# Patient Record
Sex: FEMALE | Race: WHITE | NOT HISPANIC OR LATINO | Employment: STUDENT | URBAN - METROPOLITAN AREA
[De-identification: names, ages, dates, MRNs, and addresses within clinical notes are randomized per-mention and may not be internally consistent; named-entity substitution may affect disease eponyms.]

---

## 2021-10-12 ENCOUNTER — OFFICE VISIT (OUTPATIENT)
Dept: URGENT CARE | Facility: CLINIC | Age: 31
End: 2021-10-12
Payer: COMMERCIAL

## 2021-10-12 VITALS
HEIGHT: 67 IN | WEIGHT: 120 LBS | OXYGEN SATURATION: 99 % | HEART RATE: 105 BPM | RESPIRATION RATE: 18 BRPM | BODY MASS INDEX: 18.83 KG/M2 | DIASTOLIC BLOOD PRESSURE: 83 MMHG | TEMPERATURE: 98 F | SYSTOLIC BLOOD PRESSURE: 121 MMHG

## 2021-10-12 DIAGNOSIS — G44.209 ACUTE NON INTRACTABLE TENSION-TYPE HEADACHE: Primary | ICD-10-CM

## 2021-10-12 LAB
CTP QC/QA: YES
SARS-COV-2 RDRP RESP QL NAA+PROBE: NEGATIVE

## 2021-10-12 PROCEDURE — 96372 THER/PROPH/DIAG INJ SC/IM: CPT | Mod: S$GLB,,, | Performed by: NURSE PRACTITIONER

## 2021-10-12 PROCEDURE — U0002 COVID-19 LAB TEST NON-CDC: HCPCS | Mod: QW,S$GLB,, | Performed by: NURSE PRACTITIONER

## 2021-10-12 PROCEDURE — 96372 PR INJECTION,THERAP/PROPH/DIAG2ST, IM OR SUBCUT: ICD-10-PCS | Mod: S$GLB,,, | Performed by: NURSE PRACTITIONER

## 2021-10-12 PROCEDURE — U0002: ICD-10-PCS | Mod: QW,S$GLB,, | Performed by: NURSE PRACTITIONER

## 2021-10-12 PROCEDURE — 99203 OFFICE O/P NEW LOW 30 MIN: CPT | Mod: 25,S$GLB,, | Performed by: NURSE PRACTITIONER

## 2021-10-12 PROCEDURE — 99203 PR OFFICE/OUTPT VISIT, NEW, LEVL III, 30-44 MIN: ICD-10-PCS | Mod: 25,S$GLB,, | Performed by: NURSE PRACTITIONER

## 2021-10-12 RX ORDER — METHOCARBAMOL 500 MG/1
500 TABLET, FILM COATED ORAL 3 TIMES DAILY
Qty: 30 TABLET | Refills: 0 | Status: SHIPPED | OUTPATIENT
Start: 2021-10-12 | End: 2021-10-22

## 2021-10-12 RX ORDER — KETOROLAC TROMETHAMINE 30 MG/ML
30 INJECTION, SOLUTION INTRAMUSCULAR; INTRAVENOUS
Status: COMPLETED | OUTPATIENT
Start: 2021-10-12 | End: 2021-10-12

## 2021-10-12 RX ADMIN — KETOROLAC TROMETHAMINE 30 MG: 30 INJECTION, SOLUTION INTRAMUSCULAR; INTRAVENOUS at 11:10

## 2024-04-30 ENCOUNTER — OFFICE VISIT (OUTPATIENT)
Dept: PRIMARY CARE CLINIC | Facility: CLINIC | Age: 34
End: 2024-04-30
Payer: COMMERCIAL

## 2024-04-30 VITALS
HEART RATE: 106 BPM | OXYGEN SATURATION: 99 % | HEIGHT: 67 IN | DIASTOLIC BLOOD PRESSURE: 70 MMHG | RESPIRATION RATE: 18 BRPM | WEIGHT: 181.13 LBS | BODY MASS INDEX: 28.43 KG/M2 | SYSTOLIC BLOOD PRESSURE: 100 MMHG

## 2024-04-30 DIAGNOSIS — F33.0 MILD EPISODE OF RECURRENT MAJOR DEPRESSIVE DISORDER: ICD-10-CM

## 2024-04-30 DIAGNOSIS — Z00.00 ANNUAL PHYSICAL EXAM: Primary | ICD-10-CM

## 2024-04-30 PROCEDURE — 3078F DIAST BP <80 MM HG: CPT | Mod: CPTII,S$GLB,, | Performed by: NURSE PRACTITIONER

## 2024-04-30 PROCEDURE — 99999 PR PBB SHADOW E&M-EST. PATIENT-LVL III: CPT | Mod: PBBFAC,,, | Performed by: NURSE PRACTITIONER

## 2024-04-30 PROCEDURE — 1159F MED LIST DOCD IN RCRD: CPT | Mod: CPTII,S$GLB,, | Performed by: NURSE PRACTITIONER

## 2024-04-30 PROCEDURE — 3008F BODY MASS INDEX DOCD: CPT | Mod: CPTII,S$GLB,, | Performed by: NURSE PRACTITIONER

## 2024-04-30 PROCEDURE — 99203 OFFICE O/P NEW LOW 30 MIN: CPT | Mod: S$GLB,,, | Performed by: NURSE PRACTITIONER

## 2024-04-30 PROCEDURE — 3074F SYST BP LT 130 MM HG: CPT | Mod: CPTII,S$GLB,, | Performed by: NURSE PRACTITIONER

## 2024-04-30 RX ORDER — ESCITALOPRAM OXALATE 20 MG/1
TABLET ORAL
COMMUNITY
Start: 2022-05-29 | End: 2024-04-30 | Stop reason: SDUPTHER

## 2024-04-30 RX ORDER — ESCITALOPRAM OXALATE 20 MG/1
20 TABLET ORAL DAILY
Qty: 90 TABLET | Refills: 3 | Status: SHIPPED | OUTPATIENT
Start: 2024-04-30

## 2024-04-30 RX ORDER — ONDANSETRON 4 MG/1
4 TABLET, FILM COATED ORAL DAILY PRN
COMMUNITY
Start: 2024-04-05

## 2024-04-30 NOTE — PROGRESS NOTES
Chief Complaint  Chief Complaint   Patient presents with    Establish Care    Medication Refill       HPI    Patient here to establish care. Not previously under the care of PCP. Was previously being treated online virtually for mental health and other health care needs. Patient is a non-smoker. No excessive EtOH. Not seeing OBGYN currently. Last pap was 10 years ago. Normal. No abnormal pap in the past. No family h/o breast cancer. Family h/o premature CAD. Father with DM. No blood work on file. Recently completed school addiction medicine, finished her masters in clinical mental health counseling.     Recently started semiglutide 0.25 has been on for two months. Overall she has been feeling well on it, has lost 10 lbs.     NEGRO/MDD - Has been on lexapro 20 mg for some time. Overall she has been feeling well, sleeping well. No irritability. Anxiety has improved. Was previously on zoloft which made her exhausted but lexapro has helped tremendously.     Medication Refill  Pertinent negatives include no arthralgias, chest pain, chills, coughing, fever, headaches, joint swelling, nausea, neck pain, vomiting or weakness.           PAST MEDICAL HISTORY:  No past medical history on file.    PAST SURGICAL HISTORY:  No past surgical history on file.    SOCIAL HISTORY:  Social History     Socioeconomic History    Marital status: Single   Tobacco Use    Smokeless tobacco: Never   Substance and Sexual Activity    Alcohol use: Yes     Alcohol/week: 3.0 standard drinks of alcohol     Types: 3 Glasses of wine per week    Drug use: No    Sexual activity: Yes     Partners: Male     Birth control/protection: OCP   Social History Narrative    ** Merged History Encounter **            FAMILY HISTORY:  No family history on file.    ALLERGIES AND MEDICATIONS: updated and reviewed.  Review of patient's allergies indicates:  No Known Allergies  Current Outpatient Medications   Medication Sig Dispense Refill    levonorgestreL (MIRENA) 20  "mcg/24 hours (7 yrs) 52 mg IUD 1 each by Intrauterine route once.      ondansetron (ZOFRAN) 4 MG tablet Take 4 mg by mouth daily as needed.      semaglutide (OZEMPIC) 0.25 mg or 0.5 mg (2 mg/3 mL) pen injector Inject 0.5 mg into the skin every 7 days.      EScitalopram oxalate (LEXAPRO) 20 MG tablet Take 1 tablet (20 mg total) by mouth once daily. 90 tablet 3     No current facility-administered medications for this visit.         ROS  Review of Systems   Constitutional:  Negative for activity change, chills, fever and unexpected weight change.   HENT:  Negative for ear pain, hearing loss, postnasal drip, rhinorrhea, sinus pain and trouble swallowing.    Eyes:  Negative for discharge and visual disturbance.   Respiratory:  Negative for cough, chest tightness, shortness of breath and wheezing.    Cardiovascular:  Negative for chest pain and palpitations.   Gastrointestinal:  Negative for blood in stool, constipation, diarrhea, nausea and vomiting.   Endocrine: Negative for polydipsia and polyuria.   Genitourinary:  Negative for difficulty urinating, dysuria, hematuria and menstrual problem.   Musculoskeletal:  Negative for arthralgias, joint swelling and neck pain.   Neurological:  Negative for weakness and headaches.   Psychiatric/Behavioral:  Negative for confusion and dysphoric mood.            PHYSICAL EXAM  Vitals:    04/30/24 1458   BP: 100/70   BP Location: Right arm   Patient Position: Sitting   BP Method: Medium (Manual)   Pulse: 106   Resp: 18   SpO2: 99%   Weight: 82.2 kg (181 lb 1.7 oz)   Height: 5' 7" (1.702 m)    Body mass index is 28.37 kg/m².  Weight: 82.2 kg (181 lb 1.7 oz)   Height: 5' 7" (170.2 cm)     Physical Exam  Constitutional:       Appearance: She is well-developed.   HENT:      Head: Normocephalic.      Right Ear: Tympanic membrane normal.      Left Ear: Tympanic membrane normal.      Mouth/Throat:      Pharynx: Uvula midline.   Eyes:      Conjunctiva/sclera: Conjunctivae normal. "   Cardiovascular:      Rate and Rhythm: Normal rate and regular rhythm.      Pulses: Normal pulses.           Radial pulses are 2+ on the right side and 2+ on the left side.      Heart sounds: Normal heart sounds. No murmur heard.     Comments: No LE swelling noted  Pulmonary:      Effort: Pulmonary effort is normal.      Breath sounds: Normal breath sounds. No wheezing.   Abdominal:      General: Bowel sounds are normal.      Palpations: Abdomen is soft.      Tenderness: There is no abdominal tenderness.   Lymphadenopathy:      Cervical: No cervical adenopathy.   Skin:     General: Skin is warm and dry.      Findings: No rash.   Neurological:      Mental Status: She is alert and oriented to person, place, and time.           Health Maintenance         Date Due Completion Date    Hepatitis C Screening Never done ---    Lipid Panel Never done ---    HIV Screening Never done ---    Cervical Cancer Screening 10/23/2016 10/23/2013    COVID-19 Vaccine (5 - 2023-24 season) 09/01/2023 10/7/2022    TETANUS VACCINE 04/30/2025 (Originally 8/15/2017) 8/15/2007              Assessment & Plan    Problem List Items Addressed This Visit    None  Visit Diagnoses       Annual physical exam    -  Primary    Relevant Orders    CBC Auto Differential    Comprehensive Metabolic Panel    Lipid Panel    TSH    Hepatitis C Antibody    HIV 1/2 Ag/Ab (4th Gen)    Mild episode of recurrent major depressive disorder        Relevant Medications    ondansetron (ZOFRAN) 4 MG tablet    BMI 28.0-28.9,adult        Relevant Medications    semaglutide (OZEMPIC) 0.25 mg or 0.5 mg (2 mg/3 mL) pen injector            Follow-up: Follow up in about 1 year (around 4/30/2025) for lab work then routine physical.    Shea Bertrand    Medication List with Changes/Refills   Current Medications    LEVONORGESTREL (MIRENA) 20 MCG/24 HOURS (7 YRS) 52 MG IUD    1 each by Intrauterine route once.    ONDANSETRON (ZOFRAN) 4 MG TABLET    Take 4 mg by mouth daily as  needed.    SEMAGLUTIDE (OZEMPIC) 0.25 MG OR 0.5 MG (2 MG/3 ML) PEN INJECTOR    Inject 0.5 mg into the skin every 7 days.   Changed and/or Refilled Medications    Modified Medication Previous Medication    ESCITALOPRAM OXALATE (LEXAPRO) 20 MG TABLET EScitalopram oxalate (LEXAPRO) 20 MG tablet       Take 1 tablet (20 mg total) by mouth once daily.       Discontinued Medications    MICROGESTIN 1/20, 21, 1-20 MG-MCG PER TABLET    1 DAILY FOR A FULL CYCLE. STOP FOR 1 WEEK, THEN REPEAT, AS DIRECTED

## 2024-09-18 ENCOUNTER — OFFICE VISIT (OUTPATIENT)
Dept: OBSTETRICS AND GYNECOLOGY | Facility: CLINIC | Age: 34
End: 2024-09-18
Payer: COMMERCIAL

## 2024-09-18 VITALS
HEIGHT: 67 IN | WEIGHT: 175.06 LBS | BODY MASS INDEX: 27.48 KG/M2 | DIASTOLIC BLOOD PRESSURE: 64 MMHG | SYSTOLIC BLOOD PRESSURE: 101 MMHG

## 2024-09-18 DIAGNOSIS — Z12.4 SCREENING FOR CERVICAL CANCER: ICD-10-CM

## 2024-09-18 DIAGNOSIS — Z01.419 WELL WOMAN EXAM WITH ROUTINE GYNECOLOGICAL EXAM: Primary | ICD-10-CM

## 2024-09-18 PROCEDURE — 3008F BODY MASS INDEX DOCD: CPT | Mod: CPTII,S$GLB,, | Performed by: OBSTETRICS & GYNECOLOGY

## 2024-09-18 PROCEDURE — 99385 PREV VISIT NEW AGE 18-39: CPT | Mod: S$GLB,,, | Performed by: OBSTETRICS & GYNECOLOGY

## 2024-09-18 PROCEDURE — 3074F SYST BP LT 130 MM HG: CPT | Mod: CPTII,S$GLB,, | Performed by: OBSTETRICS & GYNECOLOGY

## 2024-09-18 PROCEDURE — 1160F RVW MEDS BY RX/DR IN RCRD: CPT | Mod: CPTII,S$GLB,, | Performed by: OBSTETRICS & GYNECOLOGY

## 2024-09-18 PROCEDURE — 99999 PR PBB SHADOW E&M-EST. PATIENT-LVL III: CPT | Mod: PBBFAC,,, | Performed by: OBSTETRICS & GYNECOLOGY

## 2024-09-18 PROCEDURE — 1159F MED LIST DOCD IN RCRD: CPT | Mod: CPTII,S$GLB,, | Performed by: OBSTETRICS & GYNECOLOGY

## 2024-09-18 PROCEDURE — 3078F DIAST BP <80 MM HG: CPT | Mod: CPTII,S$GLB,, | Performed by: OBSTETRICS & GYNECOLOGY

## 2024-09-18 NOTE — PROGRESS NOTES
"Subjective     Patient ID: Darian Hernandez is a 34 y.o. female.    Chief Complaint:  Well Woman      History of Present Illness  HPI  33yo  presents for annual exam.  Overall doing well, no complaints.  Mirena IUD inserted ~ 2018 (states due for removal in 2025, which is 7 year jessica).  Amenorrheic on IUD.  Sexually active without complaints.  No h/o abn paps or STDs.  Last pap ~5 years ago, nml.      GYN & OB History  No LMP recorded. Patient has had an implant.   Date of Last Pap: 2013    OB History    Para Term  AB Living   0 0 0 0 0 0   SAB IAB Ectopic Multiple Live Births   0 0 0 0 0       Review of Systems  Review of Systems   Constitutional:  Negative for chills and fever.   Respiratory:  Negative for shortness of breath.    Cardiovascular:  Negative for chest pain.   Gastrointestinal:  Negative for abdominal pain, constipation and diarrhea.   Genitourinary:  Negative for dyspareunia, pelvic pain and vaginal discharge.   Musculoskeletal:  Negative for myalgias.   Neurological:  Negative for headaches (occ stress HA).          Objective   Physical Exam    /64   Ht 5' 7" (1.702 m)   Wt 79.4 kg (175 lb 0.7 oz)   BMI 27.42 kg/m²     Gen: NAD  Resp: Normal respiratory effort  Breast: Symmetric, nontender.  No masses.  No skin changes.  No nipple discharge.   Abd: soft, NT  Pelvic: Normal-appearing external female genitalia.  IUD strings visible at os.  No CMT.  Uterus small, mobile, nontender.  No adnexal masses or tenderness.    Ext: normal ROM  Psych: appropriate affect  Neuro: grossly intact         Assessment and Plan     Darian was seen today for well woman.    Diagnoses and all orders for this visit:    Well woman exam with routine gynecological exam    Screening for cervical cancer  -     Liquid-Based Pap Smear, Screening  -     HPV High Risk Genotypes, PCR          Plan:  Routine annual exam with pap smear and breast exam today.  Declined STD " screening.  Doing well on Mirena IUD - discussed now approved for contraception up to 8 years.  If bleeding resumes sooner than that, can replace sooner.  Counseling done, precautions given, all questions answered.  RTC 1 year for annual, or prn.

## 2024-10-01 ENCOUNTER — PROCEDURE VISIT (OUTPATIENT)
Dept: OBSTETRICS AND GYNECOLOGY | Facility: CLINIC | Age: 34
End: 2024-10-01
Payer: COMMERCIAL

## 2024-10-01 VITALS
BODY MASS INDEX: 27.68 KG/M2 | HEIGHT: 67 IN | WEIGHT: 176.38 LBS | DIASTOLIC BLOOD PRESSURE: 63 MMHG | HEART RATE: 73 BPM | SYSTOLIC BLOOD PRESSURE: 99 MMHG

## 2024-10-01 DIAGNOSIS — R87.613 HSIL (HIGH GRADE SQUAMOUS INTRAEPITHELIAL LESION) ON PAP SMEAR OF CERVIX: Primary | ICD-10-CM

## 2024-10-01 PROCEDURE — 88342 IMHCHEM/IMCYTCHM 1ST ANTB: CPT | Performed by: PATHOLOGY

## 2024-10-01 PROCEDURE — 88307 TISSUE EXAM BY PATHOLOGIST: CPT | Mod: 59 | Performed by: PATHOLOGY

## 2024-10-01 PROCEDURE — 57522 CONIZATION OF CERVIX: CPT | Mod: S$GLB,,, | Performed by: OBSTETRICS & GYNECOLOGY

## 2024-10-01 PROCEDURE — 88305 TISSUE EXAM BY PATHOLOGIST: CPT | Performed by: PATHOLOGY

## 2024-10-01 NOTE — PROCEDURES
LEEP    Date/Time: 10/1/2024 3:45 PM    Performed by: Mimi Gilbert MD  Authorized by: Mimi Gilbert MD    Consent obtained:  Prior to procedure the appropriate consent was completed and verified  Local anesthesia used?: Yes    Anesthesia:  Local infiltration  Local anesthetic:  Lidocaine 1% with epinephrine   Patient tolerated the procedure well with no immediate complications.      Procedure Detail:  Patient was placed in dorsolithotomy position.  An insulated bivalve speculum was placed in the patient's vagina to visualize the cervix.  Lugols solution was applied to the cervix, and decreased uptake was noted circumferentially around the cervix, about 4mm in radius.   1% lidocaine with epinephrine was then injected for local anesthesia.  The Loop was then used to excise the posterior and anterior cervix, ensuring excision of entire lesion.  Unfortunately, the IUD strings were trimmed during the procedure.  The specimen were sent to Pathology for evaluation. ECC was then completed with a sharp curette and sent to pathology. Rollerball cautery and monsels solution were used to obtain hemostasis.    All instruments were then removed from the vagina.  The patient tolerated the procedure well.    Mimi Gilbert MD

## 2024-10-04 ENCOUNTER — PATIENT MESSAGE (OUTPATIENT)
Dept: OBSTETRICS AND GYNECOLOGY | Facility: CLINIC | Age: 34
End: 2024-10-04
Payer: COMMERCIAL

## 2024-10-04 LAB
FINAL PATHOLOGIC DIAGNOSIS: NORMAL
GROSS: NORMAL
Lab: NORMAL

## 2024-10-07 ENCOUNTER — PATIENT MESSAGE (OUTPATIENT)
Dept: PRIMARY CARE CLINIC | Facility: CLINIC | Age: 34
End: 2024-10-07
Payer: COMMERCIAL

## 2024-10-17 ENCOUNTER — TELEPHONE (OUTPATIENT)
Dept: OBSTETRICS AND GYNECOLOGY | Facility: CLINIC | Age: 34
End: 2024-10-17
Payer: COMMERCIAL

## 2024-11-06 ENCOUNTER — PATIENT MESSAGE (OUTPATIENT)
Dept: PRIMARY CARE CLINIC | Facility: CLINIC | Age: 34
End: 2024-11-06
Payer: COMMERCIAL

## 2024-11-06 RX ORDER — ONDANSETRON 4 MG/1
4 TABLET, ORALLY DISINTEGRATING ORAL 2 TIMES DAILY
Qty: 15 TABLET | Refills: 0 | Status: SHIPPED | OUTPATIENT
Start: 2024-11-06

## 2025-01-28 DIAGNOSIS — Z30.09 COUNSELING FOR BIRTH CONTROL REGARDING INTRAUTERINE DEVICE (IUD): Primary | ICD-10-CM

## 2025-03-10 ENCOUNTER — PATIENT MESSAGE (OUTPATIENT)
Dept: OBSTETRICS AND GYNECOLOGY | Facility: CLINIC | Age: 35
End: 2025-03-10
Payer: COMMERCIAL

## 2025-03-12 ENCOUNTER — PROCEDURE VISIT (OUTPATIENT)
Dept: OBSTETRICS AND GYNECOLOGY | Facility: CLINIC | Age: 35
End: 2025-03-12
Payer: COMMERCIAL

## 2025-03-12 VITALS
SYSTOLIC BLOOD PRESSURE: 124 MMHG | HEIGHT: 67 IN | WEIGHT: 188.25 LBS | DIASTOLIC BLOOD PRESSURE: 87 MMHG | BODY MASS INDEX: 29.55 KG/M2

## 2025-03-12 DIAGNOSIS — D06.9 HIGH GRADE SQUAMOUS INTRAEPITHELIAL LESION (HGSIL), GRADE 3 CIN, ON BIOPSY OF CERVIX: Primary | ICD-10-CM

## 2025-03-12 DIAGNOSIS — Z30.433 ENCOUNTER FOR IUD REMOVAL AND REINSERTION: ICD-10-CM

## 2025-03-12 LAB
B-HCG UR QL: NEGATIVE
CTP QC/QA: YES

## 2025-03-12 PROCEDURE — 88305 TISSUE EXAM BY PATHOLOGIST: CPT | Performed by: PATHOLOGY

## 2025-03-12 NOTE — PROCEDURES
Removal and Insertion of Intrauterine Device    Date/Time: 3/12/2025 2:00 PM    Performed by: Mimi Gilbert MD  Authorized by: Mimi Gilbert MD    Consent:     Consent obtained:  Prior to procedure the appropriate consent was completed and verified    Consent given by:  Patient    Procedure risks and benefits discussed: yes      Patient questions answered: yes      Patient agrees, verbalizes understanding, and wants to proceed: yes     Device to be inserted was verified by patient: yes  Removal Procedure:    Removal mechanism: Aligator clamp - strings cut at time of LEEP, not visible at os.   Removed with no complications: IUD removal not due to complications   Removed due to expiration: Removal due to expiration  Insertion Procedure:   1 Intra Uterine Device levonorgestreL 52 mg       Negative urine pregnancy test: yes      Cervix cleaned and prepped: yes      Speculum placed in vagina: yes      Tenaculum applied to cervix: no      Uterus sounded: yes      Uterus sound depth (cm):  6.5    IUD inserted with no complications: yes      Strings trimmed: yes    Post-procedure:     Patient tolerated procedure well: yes      Patient will follow up after next period: yes    Comments:      Pap and ECC performed prior to procedure.  Lidocaine gel applied to cervix and allowed to sit.  Procedure then performed without difficulty.  Patient tolerated well.

## 2025-03-14 ENCOUNTER — RESULTS FOLLOW-UP (OUTPATIENT)
Dept: OBSTETRICS AND GYNECOLOGY | Facility: CLINIC | Age: 35
End: 2025-03-14

## 2025-03-14 LAB
FINAL PATHOLOGIC DIAGNOSIS: NORMAL
GROSS: NORMAL
Lab: NORMAL

## 2025-04-22 ENCOUNTER — OFFICE VISIT (OUTPATIENT)
Dept: OBSTETRICS AND GYNECOLOGY | Facility: CLINIC | Age: 35
End: 2025-04-22
Payer: COMMERCIAL

## 2025-04-22 VITALS
HEIGHT: 67 IN | BODY MASS INDEX: 29.34 KG/M2 | DIASTOLIC BLOOD PRESSURE: 74 MMHG | SYSTOLIC BLOOD PRESSURE: 118 MMHG | WEIGHT: 186.94 LBS

## 2025-04-22 DIAGNOSIS — Z30.431 IUD CHECK UP: Primary | ICD-10-CM

## 2025-04-22 PROCEDURE — 99999 PR PBB SHADOW E&M-EST. PATIENT-LVL III: CPT | Mod: PBBFAC,,, | Performed by: OBSTETRICS & GYNECOLOGY

## 2025-04-22 PROCEDURE — 99213 OFFICE O/P EST LOW 20 MIN: CPT | Mod: S$GLB,,, | Performed by: OBSTETRICS & GYNECOLOGY

## 2025-04-22 PROCEDURE — 3008F BODY MASS INDEX DOCD: CPT | Mod: CPTII,S$GLB,, | Performed by: OBSTETRICS & GYNECOLOGY

## 2025-04-22 PROCEDURE — 3078F DIAST BP <80 MM HG: CPT | Mod: CPTII,S$GLB,, | Performed by: OBSTETRICS & GYNECOLOGY

## 2025-04-22 PROCEDURE — 1159F MED LIST DOCD IN RCRD: CPT | Mod: CPTII,S$GLB,, | Performed by: OBSTETRICS & GYNECOLOGY

## 2025-04-22 PROCEDURE — 3074F SYST BP LT 130 MM HG: CPT | Mod: CPTII,S$GLB,, | Performed by: OBSTETRICS & GYNECOLOGY

## 2025-04-22 NOTE — PROGRESS NOTES
"Subjective     Patient ID: Darian Hernandez is a 34 y.o. female.    Chief Complaint:  IUD Check      History of Present Illness  HPI  34 y.o.  presents for IUD check.  Mirena IUD inserted 3/12/25.  Doing well, no complaints.  Amenorrheic.  Has not had intercourse since insertion.  No increased discharge.      GYN & OB History  No LMP recorded. Patient has had an implant.   Date of Last Pap: 3/17/2025    OB History    Para Term  AB Living   0 0 0 0 0 0   SAB IAB Ectopic Multiple Live Births   0 0 0 0 0       Review of Systems  Review of Systems   Constitutional:  Negative for chills and fever.   Respiratory:  Negative for shortness of breath.    Cardiovascular:  Negative for chest pain.   Gastrointestinal:  Negative for abdominal pain.   Genitourinary:  Negative for pelvic pain and vaginal discharge.   Musculoskeletal:  Negative for myalgias.   Neurological:  Negative for headaches.          Objective   Physical Exam    /74   Ht 5' 7" (1.702 m)   Wt 84.8 kg (186 lb 15.2 oz)   BMI 29.28 kg/m²     Gen: NAD  Resp: Normal respiratory effort  Pelvic: Normal-appearing external female genitalia.  Cervix well healed.  IUD strings visible at os.  Uterus small, mobile, nontender.  No adnexal masses or tenderness.    Ext: normal ROM  Psych: appropriate affect  Neuro: grossly intact         Assessment and Plan     Darian was seen today for iud check.    Diagnoses and all orders for this visit:    IUD check up          Plan:  Doing well on Mirena IUD.   Counseling done, precautions given, all questions answered.  RTC March for annual (pap with ECC), or prn.           "